# Patient Record
Sex: MALE | Race: WHITE | NOT HISPANIC OR LATINO
[De-identification: names, ages, dates, MRNs, and addresses within clinical notes are randomized per-mention and may not be internally consistent; named-entity substitution may affect disease eponyms.]

---

## 2017-02-28 ENCOUNTER — APPOINTMENT (OUTPATIENT)
Dept: NEUROLOGY | Facility: CLINIC | Age: 67
End: 2017-02-28

## 2017-02-28 VITALS
OXYGEN SATURATION: 97 % | HEART RATE: 82 BPM | SYSTOLIC BLOOD PRESSURE: 100 MMHG | HEIGHT: 72 IN | TEMPERATURE: 98.4 F | BODY MASS INDEX: 25.6 KG/M2 | WEIGHT: 189 LBS | DIASTOLIC BLOOD PRESSURE: 60 MMHG

## 2017-06-08 ENCOUNTER — RX RENEWAL (OUTPATIENT)
Age: 67
End: 2017-06-08

## 2019-10-24 ENCOUNTER — LABORATORY RESULT (OUTPATIENT)
Age: 69
End: 2019-10-24

## 2019-10-25 ENCOUNTER — APPOINTMENT (OUTPATIENT)
Dept: NEUROLOGY | Facility: CLINIC | Age: 69
End: 2019-10-25
Payer: COMMERCIAL

## 2019-10-25 VITALS
TEMPERATURE: 98.3 F | OXYGEN SATURATION: 97 % | BODY MASS INDEX: 26.41 KG/M2 | DIASTOLIC BLOOD PRESSURE: 61 MMHG | SYSTOLIC BLOOD PRESSURE: 101 MMHG | HEART RATE: 97 BPM | WEIGHT: 195 LBS | HEIGHT: 72 IN

## 2019-10-25 DIAGNOSIS — M62.838 OTHER MUSCLE SPASM: ICD-10-CM

## 2019-10-25 DIAGNOSIS — Z82.69 FAMILY HISTORY OF OTHER DISEASES OF THE MUSCULOSKELETAL SYSTEM AND CONNECTIVE TISSUE: ICD-10-CM

## 2019-10-25 DIAGNOSIS — M62.830 MUSCLE SPASM OF BACK: ICD-10-CM

## 2019-10-25 PROCEDURE — 99215 OFFICE O/P EST HI 40 MIN: CPT | Mod: 25

## 2019-10-25 PROCEDURE — 20553 NJX 1/MLT TRIGGER POINTS 3/>: CPT

## 2019-10-28 PROBLEM — M62.830 MUSCLE SPASM OF BACK: Status: ACTIVE | Noted: 2019-10-28

## 2019-10-28 PROBLEM — M62.838 MUSCLE SPASMS OF NECK: Status: ACTIVE | Noted: 2019-10-28

## 2019-10-28 NOTE — DISCUSSION/SUMMARY
[FreeTextEntry1] : Toño returns for follow-up today, feeling fatigued and with muscle/joint pain. In the past he has had significant elevation in CRP.  There is neck flexor and hip flexor weakness today.  Quads and wrist flexors are strong, no speech or swallowhing issues. They raise his strong family history of myotonic dystrophy, and whether he may have a mild case of this.  He also benefited symptomatically from trigger point injections, so repeated them today.\par \par Imp/Plan:\par 1) symptomatic treatments in the very short-term will be safe, while we figure out the cause of this symptoms.  WIll continue with another medrol dose pack, and trigger point injections have been helpful and he requests it again today.\par trigger point inj today.\par 2) serological screen\par 3) genetic screen for Myotonic dystrophy, for what is available internally first.\par

## 2019-10-28 NOTE — HISTORY OF PRESENT ILLNESS
[FreeTextEntry1] : Toño returned with his wife today.\par Last seen Feb 2017.\par \par He is reporting the is feeling weak, and just does not feel right.\par He feels confusion and foggy headed from time to time.\par If someone talked to him, he may not recall saying things.\par He uses quicken, but it takes him twice as long, though without errors.\par \par His legs feel heavy and he is shuffling more.  The feet feel swollen to him.\par \par In [place], he felt unwell.  He took a medrol dose pack and felt better after, lasting for another 5-6 days after, but then returned again.\par A lot of joint pain.\par Rotator cuff surgery 2017 June.\par Reports that trigger point injections in the past were helpful.\par \par No headaches.\par \par \par Also he provided new family history:  Father with myotonic dystrophy, as well as a cousin Nacho Lagos and Prosper; about 4-5 people in total, from common grandparents, but the direct parents were either men or women.\par \par Not really golfing or enjoying life.\par \par No recent ESR / CRP but had essentialy normalized 2017

## 2019-10-28 NOTE — PROCEDURE
[FreeTextEntry3] : Intramuscular (non-articular, non-tendonous) Trigger point injections were explained to the patient, and potential complications discussed including pain from site locally, and rarely infection or bleeding from these local sites of injection. \par The areas to be injected were cleaned with alcohol swabs and allowed to dry prior to injection.\par \par An anesthetic cream was applied to surfaces to be injected.\par A sterile solution of 3:1.5:1.5 ratio normal saline:4%dexamethasone:2%lidocaine no epi was drawn.\par \par Lot #s: \par Lidocaine 2.5%/Prilocaine 2.5% NDC 0351-3042-63, Lot#[id number], exp 10/20\par 0.9% saline: NDC 8457-8172-38 lot [id number] exp [id number]  \par dexamethasone 20mg/5ml NDC 15563-533-82:  [id number] exp 11/20 \par bupivacaine 0.5% NDC 88946-705-24: [id number] exp 02/23\par \par A total of 6 cc was injected in the following regions:\par 1) left and right upper trapezius\par 2) left and right supraspinitis\par 3) left capitus spenius on the left\par 4) left and right rhomboids\par \par The patient tolerated the procedure without issues.\par \par

## 2019-10-28 NOTE — PHYSICAL EXAM
[FreeTextEntry1] : General:\par Constitutional:  Sitting comfortably in NAD.\par Ears, Nose, Throat: no abnormalities, mucus membranes moist\par Neck: trigger points in the neck on the right and upper back bilaterally, particularly at the supraspinitis origins and the rhomboids in addition to the upper traps x 2, worse right.\par no lymphadenopathy\par \par Extremities: no edema, clubbing or cyanosis, positive supraspinitis test\par Skin: no rash or neuro-cutaneous signs \par \par Cognitive:\par Orientation, language, memory and knowledge screens intact.\par \par Cranial Nerves:\par II: SNOW. III/IV/VI: EOM Full.  Absent nystagmus\par V1V2V3: Symmetric, VII: Face appears symmetric VIII: Normal to screening, IX/X: Palate Elevates Symmetrical  XI: Trapezius Symmetric  XII: Tongue midline\par Motor:\par Power: no pronator drift, power 5/5 distal U/E and proximally deltoids/biceps/tricepts, but:\par 4/5 weakness bilateral Hip Flexors.\par Neck flexor weakness 4+.\par No change with repetitive testing\par Tone: normal x 4 limbs\par No tremor\par \par Coordination/Gait:\par Finger-nose intact, normal finger taps and rapid-alternating movements, \par Narrow based gait, but has trouble picking up the legs.\par

## 2019-12-10 ENCOUNTER — APPOINTMENT (OUTPATIENT)
Dept: NEUROLOGY | Facility: CLINIC | Age: 69
End: 2019-12-10
Payer: COMMERCIAL

## 2019-12-10 VITALS
HEART RATE: 95 BPM | BODY MASS INDEX: 26.95 KG/M2 | OXYGEN SATURATION: 95 % | DIASTOLIC BLOOD PRESSURE: 58 MMHG | HEIGHT: 72 IN | TEMPERATURE: 98.2 F | SYSTOLIC BLOOD PRESSURE: 92 MMHG | WEIGHT: 199 LBS

## 2019-12-10 PROCEDURE — 95885 MUSC TST DONE W/NERV TST LIM: CPT | Mod: 59

## 2019-12-10 PROCEDURE — 99214 OFFICE O/P EST MOD 30 MIN: CPT | Mod: 25

## 2019-12-10 PROCEDURE — 95909 NRV CNDJ TST 5-6 STUDIES: CPT

## 2019-12-10 PROCEDURE — 95887 MUSC TST DONE W/N TST NONEXT: CPT

## 2019-12-10 NOTE — HISTORY OF PRESENT ILLNESS
[FreeTextEntry1] : Referred by Dr. Felipe for muscle weakness\par Symptoms started 2 years ago with dragging his feet \par Gets tired easily \par Denies trouble releasing things from hands \par He thinks he feels weaker in the cold but is not sure \par He has had hearing aids for the past few years, and had cataract surgeries on both eyes.

## 2019-12-10 NOTE — PHYSICAL EXAM
[FreeTextEntry1] : CN normal\par Motor: 5/5 throughout, no  or percussion myotonia\par Sensory: vibration mildly reduced at toes b/l \par Reflexes: 2+ symmetric\par Gait: normal

## 2019-12-10 NOTE — PROCEDURE
[FreeTextEntry1] : Nerve Conduction and Electromyography Report [FreeTextEntry3] : Electro Physiologic Findings:\par \par Limb temperature was monitored and maintained at approximately 30 – 34° C in the lower extremities, and 32 – 36° C in the upper extremities.\par \par The right superficial fibular SNAP was mildly low amplitude. The right radial SNAP was normal. The right tibial CMAP amplitude was also low, and the F-wave latency was mildly prolonged. The right fibular and median F-wave latencies were normal. \par \par Needle electromyography was performed on select right upper and lower extremity appendicular muscles and the bilateral mid-thoracic paraspinals. There was increased insertional activity and a suggestion of electrical myotonia in the left thoracic paraspinals, whereas the other muscles tested were normal. \par \par Clinical Electrophysiological Impression: \par \par There was possible electrical myotonia in one of the muscles tested on this study, however the other muscles tested were normal. This may indicate a mild form of a myotonic disorder, or potentially be an effect of statin therapy. 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

## 2019-12-10 NOTE — ASSESSMENT
[FreeTextEntry1] : Possible myotonia seen in one muscle tested today on EMG although other muscles were normal. CK and neurologic exam are normal and there is no  or percussion myotonia on exam. However given the patient's family history and symptoms, I would recommend to check DMPK gene for myotonic dystrophy type 1. \par \par See separate procedure note for full results of study.

## 2020-01-30 ENCOUNTER — APPOINTMENT (OUTPATIENT)
Dept: NEUROLOGY | Facility: CLINIC | Age: 70
End: 2020-01-30

## 2020-02-26 ENCOUNTER — APPOINTMENT (OUTPATIENT)
Dept: NEUROLOGY | Facility: CLINIC | Age: 70
End: 2020-02-26
Payer: COMMERCIAL

## 2020-02-26 VITALS
DIASTOLIC BLOOD PRESSURE: 59 MMHG | TEMPERATURE: 98 F | WEIGHT: 200 LBS | SYSTOLIC BLOOD PRESSURE: 98 MMHG | HEIGHT: 72 IN | OXYGEN SATURATION: 94 % | BODY MASS INDEX: 27.09 KG/M2 | HEART RATE: 88 BPM

## 2020-02-26 PROCEDURE — 99214 OFFICE O/P EST MOD 30 MIN: CPT

## 2020-02-26 NOTE — PHYSICAL EXAM
[FreeTextEntry1] : General:\par Constitutional: Sitting comfortably in NAD.\par Ears, Nose, Throat: no abnormalities, mucus membranes moist\par Neck: trigger points in the neck on the right and upper back bilaterally, particularly at the supraspinitis origins and the rhomboids in addition to the upper traps x 2, worse right.\par no lymphadenopathy\par \par Extremities: no edema, clubbing or cyanosis, positive supraspinitis test\par Skin: no rash or neuro-cutaneous signs \par \par Cognitive:\par Orientation, language, memory and knowledge screens intact.\par \par Cranial Nerves:\par II: SNOW. III/IV/VI: EOM Full. Absent nystagmus\par V1V2V3: Symmetric, VII: Face appears symmetric VIII: Normal to screening, IX/X: Palate Elevates Symmetrical XI: Trapezius Symmetric XII: Tongue midline\par Motor:\par Power: no pronator drift, power 5/5 distal U/E and proximally deltoids/biceps/triceps, but:\par 4+/5 weakness bilateral Hip Flexors, fatigues.\par Neck flexor weakness, minimally budges (better than before).\par Tone: normal x 4 limbs\par No tremor\par \par Coordination/Gait:\par Finger-nose intact, normal finger taps and rapid-alternating movements, \par Narrow based gait, but has trouble picking up the legs.\par

## 2020-02-26 NOTE — DISCUSSION/SUMMARY
[FreeTextEntry1] : Toño returns for follow-up today, feeling fatigued and with muscle/joint pain.\par Medrol dose pack with slightly improvement.\par In the past he has had significant elevation in CRP but with improvement. \par Neck flexor and hip flexor weakness may be slightly improved, unclear if related to recent steroid usage.\par \par Quads and wrist flexors are strong, no speech or swallowing issues. \par Strong family history of myotonic dystrophy, and whether he may have a mild case of this; awaiting myotonic dystrophy evaluation/genetics from Dr. Escobedo.\par \par Imp/Plan:\par 1) serological screen, ESR/CRp\par 2) consider repeating medrol dose pack or low dose dexamethasone (worry about bone health)\par 3) genetic screen for Myotonic dystrophy pending

## 2020-02-26 NOTE — HISTORY OF PRESENT ILLNESS
[FreeTextEntry1] : Toño returned with his wife today.\par He was seen by Dr. Escobedo who also questioned some possible myotonia.\par \par He is crawling at times.\par Medrol dose pack just started last week, and completed a few days ago.  He felt more energetic on it, and also his energy felt better.  He has felt better on prednisone, but also agitated, too.  The achiness improved a bit.\par ESR in Oct a bit elevated at 37.\par \par In [place], will golf but will have trouble lifting his legs by the end of the 18 holes.  Has not yet noticed temperature changes.\par \par Sleeps from 9:30pm to 4 or 5am.\par Finds himself to be a bit forgetful.\par No headaches.\par Intermittent mild confusion.\par \par Family history: Father with myotonic dystrophy, as well as a cousin May, Nacho and Prosper; about 4-5 people in total, from common grandparents, but the direct parents were either men or women.\par

## 2020-06-09 ENCOUNTER — APPOINTMENT (OUTPATIENT)
Dept: NEUROLOGY | Facility: CLINIC | Age: 70
End: 2020-06-09
Payer: COMMERCIAL

## 2020-06-09 ENCOUNTER — TRANSCRIPTION ENCOUNTER (OUTPATIENT)
Age: 70
End: 2020-06-09

## 2020-06-09 PROCEDURE — 99215 OFFICE O/P EST HI 40 MIN: CPT | Mod: 95

## 2020-06-09 NOTE — ASSESSMENT
[FreeTextEntry1] : Recommended sleep study again, and to take mexiletine regularly 150mg BID \par Call me after 2 weeks of that - if no significant effect will increase to 300mg BID\par Endocrinology referral\par will set up appts with 2 sons\par f/u 3 months\par

## 2020-06-09 NOTE — HISTORY OF PRESENT ILLNESS
[Home] : at home, [unfilled] , at the time of the visit. [Other Location: e.g. Home (Enter Location, City,State)___] : at [unfilled] [Verbal consent obtained from patient] : the patient, [unfilled] [FreeTextEntry1] : \par Last seen in December 2019; he tested positive for DM1 (DMPK)\par He had a holter monitor which was normal, and previously had an echocardiogram which was normal\par His eyes feel blurry sometimes, but 15-20 minutes later it gets better\par He has not been taking mexilitene regularly; when he did take it he did not notice any side effects\par He has not had a sleep study; his wife says he snores when he's on his back\par He has also not seen an endocrinologist

## 2020-06-16 ENCOUNTER — RX RENEWAL (OUTPATIENT)
Age: 70
End: 2020-06-16

## 2020-10-16 ENCOUNTER — APPOINTMENT (OUTPATIENT)
Dept: NEUROLOGY | Facility: CLINIC | Age: 70
End: 2020-10-16
Payer: COMMERCIAL

## 2020-10-16 VITALS
BODY MASS INDEX: 27.09 KG/M2 | WEIGHT: 200 LBS | HEIGHT: 72 IN | OXYGEN SATURATION: 98 % | DIASTOLIC BLOOD PRESSURE: 61 MMHG | TEMPERATURE: 97.5 F | HEART RATE: 80 BPM | SYSTOLIC BLOOD PRESSURE: 93 MMHG

## 2020-10-16 DIAGNOSIS — K59.00 CONSTIPATION, UNSPECIFIED: ICD-10-CM

## 2020-10-16 DIAGNOSIS — M79.10 MYALGIA, UNSPECIFIED SITE: ICD-10-CM

## 2020-10-16 DIAGNOSIS — G47.39 OTHER SLEEP APNEA: ICD-10-CM

## 2020-10-16 DIAGNOSIS — D89.89 OTHER SPECIFIED DISORDERS INVOLVING THE IMMUNE MECHANISM, NOT ELSEWHERE CLASSIFIED: ICD-10-CM

## 2020-10-16 DIAGNOSIS — M62.89 OTHER SPECIFIED DISORDERS OF MUSCLE: ICD-10-CM

## 2020-10-16 DIAGNOSIS — G72.9 MYOPATHY, UNSPECIFIED: ICD-10-CM

## 2020-10-16 DIAGNOSIS — F51.04 PSYCHOPHYSIOLOGIC INSOMNIA: ICD-10-CM

## 2020-10-16 DIAGNOSIS — R51 HEADACHE: ICD-10-CM

## 2020-10-16 PROCEDURE — 99215 OFFICE O/P EST HI 40 MIN: CPT

## 2020-10-16 RX ORDER — SENNOSIDES 8.6 MG/1
8.6 CAPSULE, GELATIN COATED ORAL
Qty: 30 | Refills: 0 | Status: ACTIVE | COMMUNITY
Start: 2020-10-16 | End: 1900-01-01

## 2020-10-16 NOTE — HISTORY OF PRESENT ILLNESS
[FreeTextEntry1] : Toño returned with his wife today. Last seen by Dr. Felipe on February 26, 2020. \par \par He was seen by Dr. Escobedo who diagnosed him with DM1 (DMPK).\par \par Experiencing severe constipation, fatigue once he sits down he is unable to keep himself awake. He fell asleep in the waiting room. \par \par There has been an increase in falls so they are using a cane, his legs just give out. \par \par Becomes confused or forgetful at times. 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 \par \par He just feels like a train wreck. \par \par Decreased appetite because of the constipation, he has lost 10 pounds in 3-4 weeks. \par \par Medrol dose pack just started last week, and completed a few days ago. He felt more energetic on it, and also his energy felt better. He has felt better on prednisone, but also agitated, too. The achiness improved a bit.\par \par ESR in Oct a bit elevated at 37.\par \par Sleep:\par Retires: 930 pm \par Wakes up: 1 am \par Goes back to sleep from 4-7 am \par Takes a nap at 9 am\par *Totals about 5 hours of sleep\par \par In [place], will golf but will have trouble lifting his legs by the end of the 18 holes. Has not yet noticed temperature changes.\par \par Family history: Father with myotonic dystrophy, as well as a cousin Nacho Lagos and Prosper; about 4-5 people in total, from common grandparents, but the direct parents were either men or women.

## 2020-10-16 NOTE — PHYSICAL EXAM
[FreeTextEntry1] : \par General:\par Constitutional: Sitting comfortably in NAD.\par Ears, Nose, Throat: no abnormalities, mucus membranes moist\par Neck: trigger points in the neck on the right and upper back bilaterally, particularly at the supraspinitis origins and the rhomboids in addition to the upper traps x 2, worse right.\par no lymphadenopathy\par \par Extremities: no edema, clubbing or cyanosis, positive supraspinitis test\par Skin: no rash or neuro-cutaneous signs \par \par Cognitive:\par Orientation, language, memory and knowledge screens intact.\par \par Cranial Nerves:\par II: SNOW. III/IV/VI: EOM Full. Absent nystagmus\par V1V2V3: Symmetric, VII: Face appears symmetric VIII: Normal to screening, IX/X: Palate Elevates Symmetrical XI: Trapezius Symmetric XII: Tongue midline\par Motor:\par Power: no pronator drift, power 5/5 distal U/E and proximally deltoids/biceps/triceps, but:\par 4+/5 weakness bilateral Hip Flexors, fatigues.\par Neck flexor weakness, minimally budges (better than before).\par Tone: normal x 4 limbs\par No tremor\par \par Coordination/Gait:\par Finger-nose intact, normal finger taps and rapid-alternating movements, \par Narrow based gait, but has trouble picking up the legs.

## 2020-10-16 NOTE — DISCUSSION/SUMMARY
[FreeTextEntry1] : Impression:\par 1) Recently diagnosed with DM 1. Episodes of weakness and poor balance. Still experiencing joint pain. \par 2) Increase in anxiety and poor sleep habits. Never had sleep issues in the past. Wife reports snoring on back but he normally sleeps on his left side\par 3) Episode of depression and started seeing a psychiatrist, he was on zoloft and changed him to effexor. Just restarted zoloft on Wednesday. \par 4) New onset constipation. Has been using fleet enemas at home, that have been helping. \par \par Plan:\par 1) As per Dr. Escobedo can increase to 300 mg BID\par 2) Needs home sleep study test \par 3) Try ZMA-theanine helps with muscle recovery and will help you stay asleep. If that is not enough to hep with sleep can try mirtazapine or ambien at night. \par 4) Follow up with gastro MD, has appointment on Monday. Recommended senna. Increase hydration. \par 5) Continue with PT, okay to golf if feeling okay. \par 6) Follow up in 6-8 weeks

## 2021-10-27 ENCOUNTER — APPOINTMENT (OUTPATIENT)
Dept: NEUROLOGY | Facility: CLINIC | Age: 71
End: 2021-10-27
Payer: COMMERCIAL

## 2021-10-27 VITALS
DIASTOLIC BLOOD PRESSURE: 69 MMHG | OXYGEN SATURATION: 97 % | SYSTOLIC BLOOD PRESSURE: 111 MMHG | WEIGHT: 196 LBS | TEMPERATURE: 97.4 F | HEART RATE: 73 BPM | BODY MASS INDEX: 26.58 KG/M2

## 2021-10-27 DIAGNOSIS — G71.11 MYOTONIC MUSCULAR DYSTROPHY: ICD-10-CM

## 2021-10-27 PROCEDURE — 99214 OFFICE O/P EST MOD 30 MIN: CPT

## 2021-10-27 RX ORDER — SERTRALINE HYDROCHLORIDE 100 MG/1
100 TABLET, FILM COATED ORAL
Qty: 45 | Refills: 0 | Status: DISCONTINUED | COMMUNITY
Start: 2019-10-17 | End: 2021-10-27

## 2021-10-27 RX ORDER — BETAMETHASONE DIPROPIONATE 0.5 MG/G
0.05 CREAM TOPICAL
Qty: 45 | Refills: 0 | Status: DISCONTINUED | COMMUNITY
Start: 2019-06-20 | End: 2021-10-27

## 2021-10-27 RX ORDER — ALPRAZOLAM 0.5 MG/1
0.5 TABLET ORAL
Refills: 0 | Status: ACTIVE | COMMUNITY

## 2021-10-27 RX ORDER — TAMSULOSIN HYDROCHLORIDE 0.4 MG/1
0.4 CAPSULE ORAL
Qty: 30 | Refills: 0 | Status: DISCONTINUED | COMMUNITY
Start: 2019-06-26 | End: 2021-10-27

## 2021-10-27 RX ORDER — METHYLPREDNISOLONE 4 MG/1
4 TABLET ORAL
Qty: 1 | Refills: 1 | Status: DISCONTINUED | COMMUNITY
Start: 2019-10-25 | End: 2021-10-27

## 2021-10-27 RX ORDER — AMOXICILLIN 500 MG/1
500 CAPSULE ORAL
Qty: 20 | Refills: 0 | Status: DISCONTINUED | COMMUNITY
Start: 2019-07-10 | End: 2021-10-27

## 2021-10-27 RX ORDER — ZOLPIDEM TARTRATE 6.25 MG/1
6.25 TABLET, EXTENDED RELEASE ORAL
Qty: 30 | Refills: 0 | Status: DISCONTINUED | COMMUNITY
Start: 2020-10-16 | End: 2021-10-27

## 2021-10-27 RX ORDER — MEXILETINE HYDROCHLORIDE 150 MG/1
150 CAPSULE ORAL TWICE DAILY
Qty: 360 | Refills: 3 | Status: DISCONTINUED | COMMUNITY
Start: 2020-03-04 | End: 2021-10-27

## 2021-10-27 RX ORDER — ESCITALOPRAM OXALATE 20 MG/1
20 TABLET, FILM COATED ORAL DAILY
Refills: 0 | Status: ACTIVE | COMMUNITY

## 2021-10-27 NOTE — DISCUSSION/SUMMARY
[FreeTextEntry1] : Impression:\par 1) normal pressure hydrocephalus, developing particularly from January 2021, with significant improvement, though still not able to golf, due to balance issues.\par 2) DM1 carrier\par \par Plan:\par 1) repeat MRI brain with CSF flow.\par 2) will need shunt resetting, with neurosurgery.\par \par

## 2021-10-27 NOTE — PHYSICAL EXAM
[FreeTextEntry1] : General:\par Constitutional:  Sitting comfortably in NAD.\par Ears, Nose, Throat: no abnormalities, mucus membranes moist\par Neck: trigger points in the neck on the right and upper back bilaterally.\par no lymphadenopathy\par \par Extremities: no edema, clubbing or cyanosis, positive supraspinitis test\par Skin: no rash or neuro-cutaneous signs \par \par Cognitive:\par Orientation, language, memory and knowledge screens intact.\par registration 4/5 x 2\par 1000-7=993, 98..\par 100-93- \par [id number]\par Recall:  2/5, 4/5 with prompting.\par \par Cranial Nerves:\par II: SNOW. III/IV/VI: EOM Full.  Absent nystagmus\par V1V2V3: Symmetric, VII: Face appears symmetric VIII: Normal to screening, IX/X: Palate Elevates Symmetrical  XI: Trapezius Symmetric  XII: Tongue midline\par Motor:\par Power: no pronator drift, power 5/5 distal U/E\par Tone: normal x 4 limbs\par No tremor\par Coordination/Gait:\par Finger-nose intact, normal finger taps and rapid-alternating movements, \par Mild difficulty with alternating movements toe-heel\par Narrow based gait - fragile tandem.\par \par \par

## 2021-10-27 NOTE — HISTORY OF PRESENT ILLNESS
[FreeTextEntry1] : Toño is a 70 yo with a history of \par \par In [place], during covid, he was seen to decline rapidly.\par It was the gait, but also cognitive. \par He fell, tore his bicep and requires surgery.\par He continued to decline, very rapidly, including urinary incontinence, and he was seen at [facility] [place].\par \par He was seen in the DM1 clinic, and instead it was thought to be normal pressure hydrocephalus.\par He had multiple MRIs and the large volume spinal tap he improved to normal for several hours.\par \par A shunt was inserted, July 6, 2021, and within a week, the walking improved, his math skills returned.\par Around 3 months, the beginning of October, his gait worsened again.\par His confusion has returned, and he is using the bathroom more frequently.\par \par They believe his pressure is at 6cm H2O now.  \par The most recent imaging was Aug 2021.\par

## 2021-11-21 PROBLEM — G91.2 NORMAL PRESSURE HYDROCEPHALUS: Status: ACTIVE | Noted: 2021-10-27

## 2021-11-22 ENCOUNTER — APPOINTMENT (OUTPATIENT)
Dept: MRI IMAGING | Facility: CLINIC | Age: 71
End: 2021-11-22
Payer: COMMERCIAL

## 2021-11-22 ENCOUNTER — APPOINTMENT (OUTPATIENT)
Dept: NEUROSURGERY | Facility: CLINIC | Age: 71
End: 2021-11-22
Payer: COMMERCIAL

## 2021-11-22 ENCOUNTER — RESULT REVIEW (OUTPATIENT)
Age: 71
End: 2021-11-22

## 2021-11-22 ENCOUNTER — OUTPATIENT (OUTPATIENT)
Dept: OUTPATIENT SERVICES | Facility: HOSPITAL | Age: 71
LOS: 1 days | End: 2021-11-22

## 2021-11-22 VITALS
TEMPERATURE: 97.5 F | BODY MASS INDEX: 25.73 KG/M2 | HEART RATE: 71 BPM | DIASTOLIC BLOOD PRESSURE: 59 MMHG | OXYGEN SATURATION: 98 % | HEIGHT: 72 IN | SYSTOLIC BLOOD PRESSURE: 101 MMHG | WEIGHT: 190 LBS

## 2021-11-22 DIAGNOSIS — G91.2 (IDIOPATHIC) NORMAL PRESSURE HYDROCEPHALUS: ICD-10-CM

## 2021-11-22 PROCEDURE — 70551 MRI BRAIN STEM W/O DYE: CPT | Mod: 26

## 2021-11-22 PROCEDURE — 99203 OFFICE O/P NEW LOW 30 MIN: CPT

## 2021-11-22 PROCEDURE — 62252 CSF SHUNT REPROGRAM: CPT

## 2021-11-22 NOTE — ASSESSMENT
[FreeTextEntry1] : \par PLAN\par \par Examined the patient\par Gait is off a little as per family\par Shunt verified at 6 and will dial it to 5\par Reviewed symptoms of confusion increase in headaches or lethargy call the office  \par Continue current medical \par \par \par \par \par \par I, Dr. Rivas, personally performed the evaluation and management (E/M) services for this new patient. That E/M includes conducting the initial examination, assessing all conditions, and establishing the plan of care. Today, my ACP, Cathy Styles, was here to observe my evaluation and management services for this patient to be followed going forward.\par \par

## 2021-11-22 NOTE — PHYSICAL EXAM
[General Appearance - Alert] : alert [General Appearance - In No Acute Distress] : in no acute distress [Oriented To Time, Place, And Person] : oriented to person, place, and time [Impaired Insight] : insight and judgment were intact [Affect] : the affect was normal [Person] : oriented to person [Place] : oriented to place [Time] : oriented to time [Short Term Intact] : short term memory intact [Remote Intact] : remote memory intact [Span Intact] : the attention span was normal [Concentration Intact] : normal concentrating ability [Fluency] : fluency intact [Comprehension] : comprehension intact [Current Events] : adequate knowledge of current events [Past History] : adequate knowledge of personal past history [Vocabulary] : adequate range of vocabulary [Cranial Nerves Optic (II)] : visual acuity intact bilaterally,  pupils equal round and reactive to light [Cranial Nerves Oculomotor (III)] : extraocular motion intact [Cranial Nerves Trigeminal (V)] : facial sensation intact symmetrically [Cranial Nerves Facial (VII)] : face symmetrical [Cranial Nerves Vestibulocochlear (VIII)] : hearing was intact bilaterally [Cranial Nerves Glossopharyngeal (IX)] : tongue and palate midline [Cranial Nerves Accessory (XI - Cranial And Spinal)] : head turning and shoulder shrug symmetric [Cranial Nerves Hypoglossal (XII)] : there was no tongue deviation with protrusion [Motor Tone] : muscle tone was normal in all four extremities [Motor Strength] : muscle strength was normal in all four extremities [No Muscle Atrophy] : normal bulk in all four extremities [Sensation Tactile Decrease] : light touch was intact [Abnormal Walk] : normal gait [Balance] : balance was intact [Past-pointing] : there was no past-pointing [Tremor] : no tremor present [2+] : Patella left 2+ [FreeTextEntry1] : General:\par Constitutional:  Sitting comfortably in NAD.\par Ears, Nose, Throat: no abnormalities, mucus membranes moist\par Neck: trigger points in the neck on the right and upper back bilaterally.\par no lymphadenopathy\par \par Extremities: no edema, clubbing or cyanosis, positive supraspinitis test\par Skin: no rash or neuro-cutaneous signs \par \par Cognitive:\par Orientation, language, memory and knowledge screens intact.\par registration 4/5 x 2\par 1000-7=993, 98..\par 100-93- \par [id number]\par Recall:  2/5, 4/5 with prompting.\par \par Cranial Nerves:\par II: SNOW. III/IV/VI: EOM Full.  Absent nystagmus\par V1V2V3: Symmetric, VII: Face appears symmetric VIII: Normal to screening, IX/X: Palate Elevates Symmetrical  XI: Trapezius Symmetric  XII: Tongue midline\par Motor:\par Power: no pronator drift, power 5/5 distal U/E\par Tone: normal x 4 limbs\par No tremor\par Coordination/Gait:\par Finger-nose intact, normal finger taps and rapid-alternating movements, \par Mild difficulty with alternating movements toe-heel\par Narrow based gait - fragile tandem.\par \par \par

## 2021-11-22 NOTE — HISTORY OF PRESENT ILLNESS
[FreeTextEntry1] : 72 yo right handed male is a 72 yo with a history of NPH and July 6, 2021 VPS inserted by Dr. Khoury [phone]   with improvement ub his ambulation and memory\par October 2021 symptoms returned  His confusion returned and is using the bathroom more frequently.  He is seeing Dr Felipe who referred to Dr Rivas to evaluate the shunt setting and review images\par \par \par \par They believe his pressure is at 6cm H2O now.  \par The most recent imaging was Aug 2021. no SDH  shunt patent\par \par Today shunt readjusted to 5 from 6 \par \par \par

## 2021-11-22 NOTE — REASON FOR VISIT
[New Patient Visit] : a new patient visit [Referred By: _________] : Patient was referred by [facility] [Spouse] : spouse [FreeTextEntry1] : Evaluate shunt settings